# Patient Record
Sex: FEMALE | Race: OTHER | HISPANIC OR LATINO | URBAN - METROPOLITAN AREA
[De-identification: names, ages, dates, MRNs, and addresses within clinical notes are randomized per-mention and may not be internally consistent; named-entity substitution may affect disease eponyms.]

---

## 2024-09-29 ENCOUNTER — EMERGENCY (EMERGENCY)
Facility: HOSPITAL | Age: 29
LOS: 1 days | Discharge: HOME | End: 2024-09-29
Attending: EMERGENCY MEDICINE
Payer: MEDICAID

## 2024-09-29 VITALS
DIASTOLIC BLOOD PRESSURE: 61 MMHG | TEMPERATURE: 98 F | RESPIRATION RATE: 18 BRPM | SYSTOLIC BLOOD PRESSURE: 132 MMHG | WEIGHT: 159.39 LBS | OXYGEN SATURATION: 100 % | HEART RATE: 102 BPM

## 2024-09-29 LAB
ALBUMIN SERPL ELPH-MCNC: 4.1 G/DL — SIGNIFICANT CHANGE UP (ref 3.3–5.2)
ALP SERPL-CCNC: 73 U/L — SIGNIFICANT CHANGE UP (ref 40–120)
ALT FLD-CCNC: 16 U/L — SIGNIFICANT CHANGE UP
AMORPH CRY # UR COMP ASSIST: PRESENT
ANION GAP SERPL CALC-SCNC: 14 MMOL/L — SIGNIFICANT CHANGE UP (ref 5–17)
APPEARANCE UR: CLEAR — SIGNIFICANT CHANGE UP
AST SERPL-CCNC: 16 U/L — SIGNIFICANT CHANGE UP
BACTERIA # UR AUTO: ABNORMAL /HPF
BASOPHILS # BLD AUTO: 0.05 K/UL — SIGNIFICANT CHANGE UP (ref 0–0.2)
BASOPHILS NFR BLD AUTO: 0.5 % — SIGNIFICANT CHANGE UP (ref 0–2)
BILIRUB SERPL-MCNC: 0.4 MG/DL — SIGNIFICANT CHANGE UP (ref 0.4–2)
BILIRUB UR-MCNC: NEGATIVE — SIGNIFICANT CHANGE UP
BLD GP AB SCN SERPL QL: SIGNIFICANT CHANGE UP
BUN SERPL-MCNC: 8.4 MG/DL — SIGNIFICANT CHANGE UP (ref 8–20)
CALCIUM SERPL-MCNC: 9.7 MG/DL — SIGNIFICANT CHANGE UP (ref 8.4–10.5)
CAST: 1 /LPF — SIGNIFICANT CHANGE UP (ref 0–4)
CHLORIDE SERPL-SCNC: 104 MMOL/L — SIGNIFICANT CHANGE UP (ref 96–108)
CO2 SERPL-SCNC: 22 MMOL/L — SIGNIFICANT CHANGE UP (ref 22–29)
COLOR SPEC: YELLOW — SIGNIFICANT CHANGE UP
CREAT SERPL-MCNC: 0.53 MG/DL — SIGNIFICANT CHANGE UP (ref 0.5–1.3)
DIFF PNL FLD: ABNORMAL
EGFR: 131 ML/MIN/1.73M2 — SIGNIFICANT CHANGE UP
EOSINOPHIL # BLD AUTO: 0.46 K/UL — SIGNIFICANT CHANGE UP (ref 0–0.5)
EOSINOPHIL NFR BLD AUTO: 4.7 % — SIGNIFICANT CHANGE UP (ref 0–6)
GLUCOSE SERPL-MCNC: 94 MG/DL — SIGNIFICANT CHANGE UP (ref 70–99)
GLUCOSE UR QL: NEGATIVE MG/DL — SIGNIFICANT CHANGE UP
HCG SERPL-ACNC: 2501 MIU/ML — HIGH
HCT VFR BLD CALC: 38.5 % — SIGNIFICANT CHANGE UP (ref 34.5–45)
HGB BLD-MCNC: 13.2 G/DL — SIGNIFICANT CHANGE UP (ref 11.5–15.5)
IMM GRANULOCYTES NFR BLD AUTO: 0.4 % — SIGNIFICANT CHANGE UP (ref 0–0.9)
KETONES UR-MCNC: ABNORMAL MG/DL
LEUKOCYTE ESTERASE UR-ACNC: ABNORMAL
LYMPHOCYTES # BLD AUTO: 3.09 K/UL — SIGNIFICANT CHANGE UP (ref 1–3.3)
LYMPHOCYTES # BLD AUTO: 31.9 % — SIGNIFICANT CHANGE UP (ref 13–44)
MCHC RBC-ENTMCNC: 28.1 PG — SIGNIFICANT CHANGE UP (ref 27–34)
MCHC RBC-ENTMCNC: 34.3 GM/DL — SIGNIFICANT CHANGE UP (ref 32–36)
MCV RBC AUTO: 82.1 FL — SIGNIFICANT CHANGE UP (ref 80–100)
MONOCYTES # BLD AUTO: 0.67 K/UL — SIGNIFICANT CHANGE UP (ref 0–0.9)
MONOCYTES NFR BLD AUTO: 6.9 % — SIGNIFICANT CHANGE UP (ref 2–14)
NEUTROPHILS # BLD AUTO: 5.38 K/UL — SIGNIFICANT CHANGE UP (ref 1.8–7.4)
NEUTROPHILS NFR BLD AUTO: 55.6 % — SIGNIFICANT CHANGE UP (ref 43–77)
NITRITE UR-MCNC: NEGATIVE — SIGNIFICANT CHANGE UP
PH UR: 6 — SIGNIFICANT CHANGE UP (ref 5–8)
PLATELET # BLD AUTO: 265 K/UL — SIGNIFICANT CHANGE UP (ref 150–400)
POTASSIUM SERPL-MCNC: 3.8 MMOL/L — SIGNIFICANT CHANGE UP (ref 3.5–5.3)
POTASSIUM SERPL-SCNC: 3.8 MMOL/L — SIGNIFICANT CHANGE UP (ref 3.5–5.3)
PROT SERPL-MCNC: 7.1 G/DL — SIGNIFICANT CHANGE UP (ref 6.6–8.7)
PROT UR-MCNC: NEGATIVE MG/DL — SIGNIFICANT CHANGE UP
RBC # BLD: 4.69 M/UL — SIGNIFICANT CHANGE UP (ref 3.8–5.2)
RBC # FLD: 14 % — SIGNIFICANT CHANGE UP (ref 10.3–14.5)
RBC CASTS # UR COMP ASSIST: 3 /HPF — SIGNIFICANT CHANGE UP (ref 0–4)
SODIUM SERPL-SCNC: 140 MMOL/L — SIGNIFICANT CHANGE UP (ref 135–145)
SP GR SPEC: 1.02 — SIGNIFICANT CHANGE UP (ref 1–1.03)
SQUAMOUS # UR AUTO: 7 /HPF — HIGH (ref 0–5)
UROBILINOGEN FLD QL: 1 MG/DL — SIGNIFICANT CHANGE UP (ref 0.2–1)
WBC # BLD: 9.69 K/UL — SIGNIFICANT CHANGE UP (ref 3.8–10.5)
WBC # FLD AUTO: 9.69 K/UL — SIGNIFICANT CHANGE UP (ref 3.8–10.5)
WBC UR QL: 11 /HPF — HIGH (ref 0–5)
YEAST-LIKE CELLS: PRESENT

## 2024-09-29 PROCEDURE — 76801 OB US < 14 WKS SINGLE FETUS: CPT | Mod: 26

## 2024-09-29 PROCEDURE — 99282 EMERGENCY DEPT VISIT SF MDM: CPT | Mod: GC

## 2024-09-29 PROCEDURE — 99285 EMERGENCY DEPT VISIT HI MDM: CPT

## 2024-09-29 PROCEDURE — 76817 TRANSVAGINAL US OBSTETRIC: CPT | Mod: 26

## 2024-09-29 RX ORDER — CEPHALEXIN 500 MG
500 CAPSULE ORAL ONCE
Refills: 0 | Status: COMPLETED | OUTPATIENT
Start: 2024-09-29 | End: 2024-09-29

## 2024-09-29 RX ORDER — ACETAMINOPHEN 325 MG
650 TABLET ORAL ONCE
Refills: 0 | Status: COMPLETED | OUTPATIENT
Start: 2024-09-29 | End: 2024-09-29

## 2024-09-29 RX ADMIN — Medication 650 MILLIGRAM(S): at 21:37

## 2024-09-29 RX ADMIN — Medication 500 MILLIGRAM(S): at 23:50

## 2024-09-29 NOTE — ED ADULT NURSE NOTE - NSFALLUNIVINTERV_ED_ALL_ED
Bed/Stretcher in lowest position, wheels locked, appropriate side rails in place/Call bell, personal items and telephone in reach/Instruct patient to call for assistance before getting out of bed/chair/stretcher/Non-slip footwear applied when patient is off stretcher/[place] to call system/Physically safe environment - no spills, clutter or unnecessary equipment/Purposeful proactive rounding/Room/bathroom lighting operational, light cord in reach

## 2024-09-29 NOTE — ED STATDOCS - PATIENT PORTAL LINK FT
You can access the FollowMyHealth Patient Portal offered by [facility] by registering at the following website: [email or web address]. By joining [employer]s FollowMyHealth portal, you will also be able to view your health information using other applications (apps) compatible with our system.

## 2024-09-29 NOTE — ED STATDOCS - PROGRESS NOTE DETAILS
LILIYA Badillo: Results reviewed with patient. OB consulted.   OB: [facility] LILIYA Badillo: Patient evaluated by intake physician. HPI/ROS/PE as noted above. Will follow up plan per intake physician and continue to assess patient. LILIYA Badillo: Evaluated by OB. To be sent home with Cytotec. Has appt. with clinic tomorrow and Wednesday.

## 2024-09-29 NOTE — ED STATDOCS - OBJECTIVE STATEMENT
28-year-old female  (twins), 2 miscarriage,  approximately 3 months pregnant presents with vaginal spotting started today.  No nausea or vomiting.  Patient report mild headache.  No dysuria.  Patient has seen OB had ultrasound that was done on  reports that fetus was small.  She has a follow-up appointment.       Manoj

## 2024-09-29 NOTE — ED STATDOCS - NSFOLLOWUPINSTRUCTIONS_ED_ALL_ED_FT
- Please keep all scheduled follow up appointments with OBGYN.   - Please bring all documentation from your ED visit to any related future follow up appointment.  - Please call to schedule follow up appointment with your primary care physician within 24-48 hours.  - Please seek immediate medical attention or return to the ED for any new/worsening, signs/symptoms, or concerns.    - Acuda a todas las citas de seguimiento programadas con el obstetra y ginecólogo.   - Traiga toda la documentación de white visita al servicio de urgencias a cualquier angela de seguimiento futura relacionada.  - Llame para programar marisa angela de seguimiento con white médico de atención primaria dentro de las 24 a 48 horas.  - Busque atención médica inmediata o regrese al servicio de urgencias si detecta signos, síntomas o inquietudes nuevos o que empeoran.        Aborto espontáneo  Miscarriage    El aborto espontáneo es la pérdida de un bebé que no ha nacido (feto) antes de la semana 20 del embarazo. La mayor parte de los abortos espontáneos ocurre alin los primeros 3 meses de embarazo. A veces, un aborto ocurre antes de que la polo sepa que está embarazada.    El aborto espontáneo puede ser marisa experiencia que afecte emocionalmente a la persona. Si ha sufrido un aborto espontáneo, hable con white médico y hágale las preguntas que tenga sobre el aborto espontáneo, el proceso de duelo y los planes futuros de embarazo.    ¿Cuáles son las causas?  Entre las causas de un aborto espontáneo se incluyen las siguientes:    Problemas genéticos o cromosómicos del feto. Estos problemas impiden que el bebé se desarrolle con normalidad. En general, son el resultado de errores fortuitos que ocurren en la etapa temprana del desarrollo y que no se transmiten de padres a hijos (no se heredan).  Infección en el rani del útero.  Trastornos que afectan el equilibrio hormonal del organismo.  Problemas en el rani del útero, madison white adelgazamiento y apertura antes de que el embarazo llegue a término (insuficiencia del rani de útero).  Problemas en el útero. Estos pueden incluir, entre otros, los siguientes:    Forma anormal del útero.  Fibromas en el útero.  Anormalidades congénitas. Estos son problemas que ya estaban presentes en el nacimiento.  Ciertas enfermedades crónicas.  Fumar, beber alcohol o usar drogas.  Lesiones (traumatismos).    En muchos de los casos, se desconoce la causa de los abortos espontáneos.    ¿Cuáles son los signos o los síntomas?  Los síntomas de esta afección incluyen los siguientes:    Sangrado o manchado vaginal, con o sin cólicos o dolor.  Dolor o cólicos en el abdomen o en la parte inferior de la espalda.  Eliminación de líquido, tejidos o coágulos sanguíneos por la vagina.    ¿Cómo se diagnostica?  Esta afección se puede diagnosticar en función de lo siguiente:    Examen físico.  Ecografía.  Análisis de cyndie.  Análisis de orina.    ¿Cómo se trata?  En algunos casos, el tratamiento de un aborto espontáneo no es necesario si se eliminan de forma natural todos los tejidos que se encontraban en el útero. Si fuera necesario realizar un tratamiento por esta afección, yun puede incluir lo siguiente:    Dilatación y curetaje (D&C). Mediante yun procedimiento, se expande el rani del útero y se raspan las lynn (endometrio). [place] se realiza solamente si queda tejido del feto o la placenta dentro del cuerpo (aborto espontáneo incompleto).  Medicamentos, por ejemplo:    Antibióticos para tratar marisa infección.  Medicamentos para ayudar al cuerpo a eliminar los restos de tejido.  Medicamentos para reducir (contraer) el tamaño del útero. Estos medicamentos se pueden administrar si tiene un sangrado abundante.    Si white factor sanguíneo es Rh negativo y el de white bebé es Rh positivo, usted necesitará marisa inyección del medicamento llamado inmunoglobulina Rhpara proteger a los bebés futuros de tener problemas con el factor sanguíneo Rh. Los términos "Rh negativo" y "Rh positivo" hacen referencia a la presencia o no en la cyndie de marisa proteína específica que se encuentra en la superficie de los glóbulos rojos (factor Rh).    Siga estas indicaciones en white casa:      Medicamentos     [place] los medicamentos de venta veronica y los recetados solamente madison se lo haya indicado el médico.  Si le recetaron antibióticos, tómelos madison se lo haya indicado el médico. No deje de maeve los antibióticos aunque comience a sentirse mejor.  No tome antiinflamatorios no esteroideos (JOSIE), tales madison aspirina e ibuprofeno, a menos que se lo indique el médico. Estos medicamentos pueden provocarle sangrado.        Actividad    [place] reposo según lo indicado. Pregúntele al médico qué actividades son seguras para usted.  Pídale a alguien que la ayude con las responsabilidades familiares y del hogar alin yun tiempo.        Instrucciones generales    Lleve un registro de la cantidad y la saturación de las toallas higiénicas que utiliza cada día. Anote esta información.  Anote la cantidad de tejido o coágulos sanguíneos que expulsa por la vagina. Guarde las cantidades grandes de tejidos para que el médico los examine.  No use tampones, no se tawana duchas vaginales ni tenga relaciones sexuales hasta que el médico la autorice.  Para que usted y white janene puedan sobrellevar el proceso del duelo, hable con white médico o busque apoyo psicológico.  Cuando esté lista, visite a white médico para hablar sobre los pasos importantes que deberá seguir en relación con white maribel. También hable sobre las medidas que deberá maeve para tener un embarazo saludable en el futuro.  Concurra a todas las visitas de seguimiento madison se lo haya indicado el médico. [place] es importante.    Dónde encontrar más información  Colegio Estadounidense de Obstetras y Ginecólogos (American College of Obstetricians and Gynecologists): www.acog.org  Departamento de Maribel y Servicios Humanos de los [place], Oficina de Maribel de la [place] (U.S. Department of Health and Human Services, Office on Women’s Health): www.womenshealth.gov    Comuníquese con un médico si:  Tiene fiebre o siente escalofríos.  Tiene marisa secreción vaginal con mal olor.  El sangrado aumenta en vez de disminuir.    Solicite ayuda de inmediato si:  Siente calambres intensos o dolor en la espalda o en el abdomen.  Elimina coágulos de cyndie o tejido por la vagina del tamaño de marisa nuez o más grandes.  Necesita más de marisa toalla higiénica de tamaño regular por hora.  Se siente mareada o débil.  Se desmaya.  Siente marisa tristeza que la invade o piensa en lastimarse.    Resumen  La mayor parte de los abortos espontáneos ocurre alin los primeros 3 meses de embarazo. En algunos casos, el aborto espontáneo ocurre antes de que la polo sepa que está embarazada.  Siga las indicaciones del médico para el cuidado en el hogar. Concurra a todas las visitas de control.  Para que usted y white janene puedan sobrellevar el proceso del duelo, hable con white médico o busque apoyo psicológico.    NOTAS ADICIONALES E INSTRUCCIONES    Please follow up with your Primary MD in 24-48 hr.  Seek immediate medical care for any new/worsening signs or symptoms. - Prescription sent to pharmacy.  - Please keep all scheduled follow up appointments with OBGYN.   - Please bring all documentation from your ED visit to any related future follow up appointment.  - Please call to schedule follow up appointment with your primary care physician within 24-48 hours.  - Please seek immediate medical attention or return to the ED for any new/worsening, signs/symptoms, or concerns.    - Receta enviada a farmacia.  - Acuda a todas las citas de seguimiento programadas con el obstetra y ginecólogo.   - Traiga toda la documentación de white visita al servicio de urgencias a cualquier angela de seguimiento futura relacionada.  - Llame para programar marisa angela de seguimiento con white médico de atención primaria dentro de las 24 a 48 horas.  - Busque atención médica inmediata o regrese al servicio de urgencias si detecta signos, síntomas o inquietudes nuevos o que empeoran.        Aborto espontáneo  Miscarriage    El aborto espontáneo es la pérdida de un bebé que no ha nacido (feto) antes de la semana 20 del embarazo. La mayor parte de los abortos espontáneos ocurre alin los primeros 3 meses de embarazo. A veces, un aborto ocurre antes de que la polo sepa que está embarazada.    El aborto espontáneo puede ser marisa experiencia que afecte emocionalmente a la persona. Si ha sufrido un aborto espontáneo, hable con white médico y hágale las preguntas que tenga sobre el aborto espontáneo, el proceso de duelo y los planes futuros de embarazo.    ¿Cuáles son las causas?  Entre las causas de un aborto espontáneo se incluyen las siguientes:    Problemas genéticos o cromosómicos del feto. Estos problemas impiden que el bebé se desarrolle con normalidad. En general, son el resultado de errores fortuitos que ocurren en la etapa temprana del desarrollo y que no se transmiten de padres a hijos (no se heredan).  Infección en el rani del útero.  Trastornos que afectan el equilibrio hormonal del organismo.  Problemas en el rani del útero, madison white adelgazamiento y apertura antes de que el embarazo llegue a término (insuficiencia del rani de útero).  Problemas en el útero. Estos pueden incluir, entre otros, los siguientes:    Forma anormal del útero.  Fibromas en el útero.  Anormalidades congénitas. Estos son problemas que ya estaban presentes en el nacimiento.  Ciertas enfermedades crónicas.  Fumar, beber alcohol o usar drogas.  Lesiones (traumatismos).    En muchos de los casos, se desconoce la causa de los abortos espontáneos.    ¿Cuáles son los signos o los síntomas?  Los síntomas de esta afección incluyen los siguientes:    Sangrado o manchado vaginal, con o sin cólicos o dolor.  Dolor o cólicos en el abdomen o en la parte inferior de la espalda.  Eliminación de líquido, tejidos o coágulos sanguíneos por la vagina.    ¿Cómo se diagnostica?  Esta afección se puede diagnosticar en función de lo siguiente:    Examen físico.  Ecografía.  Análisis de cyndie.  Análisis de orina.    ¿Cómo se trata?  En algunos casos, el tratamiento de un aborto espontáneo no es necesario si se eliminan de forma natural todos los tejidos que se encontraban en el útero. Si fuera necesario realizar un tratamiento por esta afección, yun puede incluir lo siguiente:    Dilatación y curetaje (D&C). Mediante yun procedimiento, se expande el rani del útero y se raspan las lynn (endometrio). [place] se realiza solamente si queda tejido del feto o la placenta dentro del cuerpo (aborto espontáneo incompleto).  Medicamentos, por ejemplo:    Antibióticos para tratar marisa infección.  Medicamentos para ayudar al cuerpo a eliminar los restos de tejido.  Medicamentos para reducir (contraer) el tamaño del útero. Estos medicamentos se pueden administrar si tiene un sangrado abundante.    Si white factor sanguíneo es Rh negativo y el de white bebé es Rh positivo, usted necesitará marisa inyección del medicamento llamado inmunoglobulina Rhpara proteger a los bebés futuros de tener problemas con el factor sanguíneo Rh. Los términos "Rh negativo" y "Rh positivo" hacen referencia a la presencia o no en la cyndie de marisa proteína específica que se encuentra en la superficie de los glóbulos rojos (factor Rh).    Siga estas indicaciones en white casa:      Medicamentos     [place] los medicamentos de venta veronica y los recetados solamente madison se lo haya indicado el médico.  Si le recetaron antibióticos, tómelos madison se lo haya indicado el médico. No deje de maeve los antibióticos aunque comience a sentirse mejor.  No tome antiinflamatorios no esteroideos (JOSIE), tales madison aspirina e ibuprofeno, a menos que se lo indique el médico. Estos medicamentos pueden provocarle sangrado.        Actividad    Tawana reposo según lo indicado. Pregúntele al médico qué actividades son seguras para usted.  Pídale a alguien que la ayude con las responsabilidades familiares y del hogar alin yun tiempo.        Instrucciones generales    Lleve un registro de la cantidad y la saturación de las toallas higiénicas que utiliza cada día. Anote esta información.  Anote la cantidad de tejido o coágulos sanguíneos que expulsa por la vagina. Guarde las cantidades grandes de tejidos para que el médico los examine.  No use tampones, no se tawana duchas vaginales ni tenga relaciones sexuales hasta que el médico la autorice.  Para que usted y white janene puedan sobrellevar el proceso del duelo, hable con white médico o busque apoyo psicológico.  Cuando esté lista, visite a white médico para hablar sobre los pasos importantes que deberá seguir en relación con white maribel. También hable sobre las medidas que deberá maeve para tener un embarazo saludable en el futuro.  Concurra a todas las visitas de seguimiento madison se lo haya indicado el médico. [place] es importante.    Dónde encontrar más información  Colegio Estadounidense de Obstetras y Ginecólogos (American College of Obstetricians and Gynecologists): www.acog.org  Departamento de Maribel y Servicios Humanos de los [place], Oficina de Maribel de la [place] (U.S. Department of Health and Human Services, Office on Women’s Health): www.womenshealth.gov    Comuníquese con un médico si:  Tiene fiebre o siente escalofríos.  Tiene marisa secreción vaginal con mal olor.  El sangrado aumenta en vez de disminuir.    Solicite ayuda de inmediato si:  Siente calambres intensos o dolor en la espalda o en el abdomen.  Elimina coágulos de cyndie o tejido por la vagina del tamaño de marisa nuez o más grandes.  Necesita más de marisa toalla higiénica de tamaño regular por hora.  Se siente mareada o débil.  Se desmaya.  Siente marisa tristeza que la invade o piensa en lastimarse.    Resumen  La mayor parte de los abortos espontáneos ocurre alin los primeros 3 meses de embarazo. En algunos casos, el aborto espontáneo ocurre antes de que la polo sepa que está embarazada.  Siga las indicaciones del médico para el cuidado en el hogar. Concurra a todas las visitas de control.  Para que usted y white janene puedan sobrellevar el proceso del duelo, hable con white médico o busque apoyo psicológico.    NOTAS ADICIONALES E INSTRUCCIONES    Please follow up with your Primary MD in 24-48 hr.  Seek immediate medical care for any new/worsening signs or symptoms.

## 2024-09-29 NOTE — ED STATDOCS - ATTENDING APP SHARED VISIT CONTRIBUTION OF CARE
I, Kristian Alfred, performed the initial face to face bedside interview with this patient regarding history of present illness, review of symptoms and relevant past medical, social and family history.  I completed an independent physical examination.  I was the initial provider who evaluated this patient. I have signed out the follow up of any pending tests (i.e. labs, radiological studies) to the [facility].  I have communicated the patient’s plan of care and disposition with the [facility].

## 2024-09-29 NOTE — ED ADULT TRIAGE NOTE - CHIEF COMPLAINT QUOTE
Pt walks in for triage c/o being 3 weeks pregnant and began vaginal spotting today at 15:00. Pt has PMHx of 2 miscarriages. Pt denies heavy bleeding and saturating pads, but endorses worsening spotting with exertion.

## 2024-09-29 NOTE — ED STATDOCS - NS ED ROS FT
CONSTITUTIONAL - no  fever, no diaphoresis, no weight change  SKIN - no rash  HEMATOLOGIC - no bleeding, no bruising  EYES - no eye pain, no blurred vision  ENT - no change in hearing, no pain  RESPIRATORY - no shortness of breath, no cough  CARDIAC - no chest pain, no palpitations  GI - no abd pain, no nausea, no vomiting, no diarrhea, no constipation, no bleeding  GENITO-URINARY - no discharge, no dysuria; no hematuria, (+) vaginal spotting   ENDO - no polydipsia, no polyuria, no heat/no cold intolerance  MUSCULOSKELETAL - no joint pain, no swelling, no redness  NEUROLOGIC - no weakness, no headache, no anesthesia, no paresthesias  PSYCH - no anxiety, non suicidal, non homicidal, no hallucination, no depression

## 2024-09-29 NOTE — ED STATDOCS - CLINICAL SUMMARY MEDICAL DECISION MAKING FREE TEXT BOX
28-year-old female  (twins), 2 miscarriage,  approximately 3 months pregnant presents with vaginal spotting started today.  No nausea or vomiting.  Patient report mild headache.  No dysuria.  Patient has seen OB had ultrasound that was done on  reports that fetus was small.  She has a follow-up appointment.     will get blood work, urine, OB ultrasound.  Will give Tylenol for headache.

## 2024-09-30 VITALS
SYSTOLIC BLOOD PRESSURE: 145 MMHG | OXYGEN SATURATION: 98 % | DIASTOLIC BLOOD PRESSURE: 81 MMHG | TEMPERATURE: 98 F | HEART RATE: 74 BPM | RESPIRATION RATE: 20 BRPM

## 2024-09-30 PROBLEM — Z00.00 ENCOUNTER FOR PREVENTIVE HEALTH EXAMINATION: Status: ACTIVE | Noted: 2024-09-30

## 2024-09-30 PROCEDURE — 86901 BLOOD TYPING SEROLOGIC RH(D): CPT

## 2024-09-30 PROCEDURE — 84702 CHORIONIC GONADOTROPIN TEST: CPT

## 2024-09-30 PROCEDURE — 80053 COMPREHEN METABOLIC PANEL: CPT

## 2024-09-30 PROCEDURE — 99284 EMERGENCY DEPT VISIT MOD MDM: CPT

## 2024-09-30 PROCEDURE — 76817 TRANSVAGINAL US OBSTETRIC: CPT

## 2024-09-30 PROCEDURE — 86850 RBC ANTIBODY SCREEN: CPT

## 2024-09-30 PROCEDURE — 36415 COLL VENOUS BLD VENIPUNCTURE: CPT

## 2024-09-30 PROCEDURE — T1013: CPT

## 2024-09-30 PROCEDURE — 81001 URINALYSIS AUTO W/SCOPE: CPT

## 2024-09-30 PROCEDURE — 76801 OB US < 14 WKS SINGLE FETUS: CPT

## 2024-09-30 PROCEDURE — 86900 BLOOD TYPING SEROLOGIC ABO: CPT

## 2024-09-30 PROCEDURE — 87086 URINE CULTURE/COLONY COUNT: CPT

## 2024-09-30 PROCEDURE — 85025 COMPLETE CBC W/AUTO DIFF WBC: CPT

## 2024-09-30 RX ORDER — FLUCONAZOLE 200 MG
150 TABLET ORAL ONCE
Refills: 0 | Status: COMPLETED | OUTPATIENT
Start: 2024-09-30 | End: 2024-09-30

## 2024-09-30 RX ADMIN — Medication 150 MILLIGRAM(S): at 02:28

## 2024-09-30 NOTE — CONSULT NOTE ADULT - ASSESSMENT
A/P:     D/w** A/P: GWEN ZENDEJAS is a 28y @ 9w1d by US who presents to ED for vaginal spotting since 3pm and suprapubic abdominal cramping, found to have intrauterine gestation with no fetal cardiac activity identified, consistent with missed .     D/w** A/P: GWEN ZENDEJAS is a 28y @ 9w1d by US who presents to ED for vaginal spotting since 3pm and suprapubic abdominal cramping, found to have intrauterine gestation with no fetal cardiac activity identified, consistent with missed .     Plan:   - This was a desired pregnancy. SAB#3 this year.  - Patient counselled about options for management of missed AB, including expectant management, cytotec medication, or surgical D&C. Pt elected for medical management with buccal Cytotec 800mg q4h for three doses.   - Pt has MFM appointment for confirmation of MAB on Monday,   - To facilitate appt with MFM for consultation for recurrent pregnancy loss  - Pt with [facility] clinic appt on Wednesday, 10/2   D/w Dr Mcneal

## 2024-09-30 NOTE — CONSULT NOTE ADULT - SUBJECTIVE AND OBJECTIVE BOX
GWEN ZENDEJAS is a 28y @ 9w1d by US who presents to ED for vaginal spotting since 3pm and suprapubic abdominal cramping.     Outpatient GYN: [facility]    OB: pCS twins (2018);  @~3months; 2024 in first trimester  Gyn: denies fibroids, cysts, or polyps; denies abn paps; denies STIs  PMH: gastritis  PSH: pCS  Med: PNV  All: NKDA    Vitals:   T(C): 36.9 (24 @ 19:17), Max: 36.9 (24 @ 19:17)  HR: 102 (24 @ 19:17) (102 - 102)  BP: 132/61 (24 @ 19:17) (132/61 - 132/61)  RR: 18 (24 @ 19:17) (18 - 18)  SpO2: 100% (24 @ 19:17) (100% - 100%)    General: AAOx3, NAD  Abd: Soft, nontender, non distended; no rebound/guarding  Pelvic:    Labs:                        13.2   9.69  )-----------( 265      ( 29 Sep 2024 21:38 )             38.5         140  |  104  |  8.4  ----------------------------<  94  3.8   |  22.0  |  0.53    Ca    9.7      29 Sep 2024 21:38    TPro  7.1  /  Alb  4.1  /  TBili  0.4  /  DBili  x   /  AST  16  /  ALT  16  /  AlkPhos  73      SERUM bhcg    2501.0   @ 21:38      Radiology:     GWEN ZENDEJAS is a 28y @ 9w1d by US who presents to ED for vaginal spotting since 3pm and suprapubic abdominal cramping.     Outpatient GYN: [facility]    OB: pCS twins (2018);  @~3months; 2024 in first trimester  Gyn: denies fibroids, cysts, or polyps; denies abn paps; denies STIs  PMH: gastritis  PSH: pCS  Med: PNV  All: NKDA    Vitals:   T(C): 36.9 (24 @ 19:17), Max: 36.9 (24 @ 19:17)  HR: 102 (24 @ 19:17) (102 - 102)  BP: 132/61 (24 @ 19:17) (132/61 - 132/61)  RR: 18 (24 @ 19:17) (18 - 18)  SpO2: 100% (24 @ 19:17) (100% - 100%)    General: AAOx3, NAD  Abd: Soft, nontender, non distended; no rebound/guarding  Speculum Exam:     Labs:                        13.2   9.69  )-----------( 265      ( 29 Sep 2024 21:38 )             38.5         140  |  104  |  8.4  ----------------------------<  94  3.8   |  22.0  |  0.53    Ca    9.7      29 Sep 2024 21:38    TPro  7.1  /  Alb  4.1  /  TBili  0.4  /  DBili  x   /  AST  16  /  ALT  16  /  AlkPhos  73      SERUM bhcg    2501.0   @ 21:38      Radiology:  < from: US Transvaginal, OB (24 @ 23:29) >  ACC: [id number] EXAM:  US OB TRANSVAGINAL   ORDERED BY: BOBBY GENTILE     ACC: [id number] EXAM:  US OB LES THAN 14 WKS 1ST GEST   ORDERED BY: BOBBY GENTILE     PROCEDURE DATE:  2024          INTERPRETATION:  CLINICAL INFORMATION: Vaginal bleeding.    LMP: 2024    Estimated Gestational Age by LMP: 12 weeks 0 days    COMPARISON: 5/3/2023    Endovaginal and transabdominal pelvic sonogram. Color and Spectral   Doppler was performed.    FINDINGS:  Uterus: 9.3 x 4.9 x 7.0 cm. Single intrauterine gestational sac.    Gestational Sac Size (mean): 39.7 mm  Gestational Sac Shape : Normal.  Crown Rump Length: 2.46 cm  Estimated Gestational Age: 9 weeks, 1 days weeks/days  Yolk Sac: Not visualized  Fetal Heart Rate: Not detected.    Right ovary: 2.4 cm x 1.7 cm x 1.6 cm. Within normal limits. Normal   arterial and venous waveforms.  Left ovary: 2.8 cm x 1.6 cm x 1.6 cm. Within normal limits. Normal   arterial and venous waveforms.    Fluid: None.    IMPRESSION:  Intrauterine gestation with gestational age of 9 weeks 1 day. No fetal   heart rate identified. Findings are compatible with fetal demise.    --- End of Report ---            CHRISTINA REYNOLDS MD; Attending Radiologist  This document has been electronically signed. Sep 30 2024 12:04AM    < end of copied text >     GWEN ZENDEJAS is a 28y @ 9w1d by US who presents to ED for vaginal spotting since 3pm and suprapubic abdominal cramping.     Outpatient GYN: [facility]    OB: pCS twins (2018);  @~7-8weeks; 2024 in first trimester (both w/ expectant management)  Gyn: denies fibroids, cysts, or polyps; denies abn paps; denies STIs  PMH: gastritis  PSH: pCS  Med: PNV  All: NKDA    Vitals:   T(C): 36.9 (24 @ 19:17), Max: 36.9 (24 @ 19:17)  HR: 102 (24 @ 19:17) (102 - 102)  BP: 132/61 (24 @ 19:17) (132/61 - 132/61)  RR: 18 (24 @ 19:17) (18 - 18)  SpO2: 100% (24 @ 19:17) (100% - 100%)    General: AAOx3, NAD  Abd: Soft, nontender, non distended; no rebound/guarding  Speculum Exam: some dark colored discharge noted from cervical os; no active bleeding noted  SVE: closed cervical os    Labs:                        13.2   9.69  )-----------( 265      ( 29 Sep 2024 21:38 )             38.5         140  |  104  |  8.4  ----------------------------<  94  3.8   |  22.0  |  0.53    Ca    9.7      29 Sep 2024 21:38    TPro  7.1  /  Alb  4.1  /  TBili  0.4  /  DBili  x   /  AST  16  /  ALT  16  /  AlkPhos  73      SERUM bhcg    2501.0   @ 21:38      Radiology:  < from: US Transvaginal, OB (24 @ 23:29) >  ACC: [id number] EXAM:  US OB TRANSVAGINAL   ORDERED BY: BOBBY GENTILE     ACC: [id number] EXAM:  US OB LES THAN 14 WKS 1ST GEST   ORDERED BY: BOBBY GENTILE     PROCEDURE DATE:  2024          INTERPRETATION:  CLINICAL INFORMATION: Vaginal bleeding.    LMP: 2024    Estimated Gestational Age by LMP: 12 weeks 0 days    COMPARISON: 5/3/2023    Endovaginal and transabdominal pelvic sonogram. Color and Spectral   Doppler was performed.    FINDINGS:  Uterus: 9.3 x 4.9 x 7.0 cm. Single intrauterine gestational sac.    Gestational Sac Size (mean): 39.7 mm  Gestational Sac Shape : Normal.  Crown Rump Length: 2.46 cm  Estimated Gestational Age: 9 weeks, 1 days weeks/days  Yolk Sac: Not visualized  Fetal Heart Rate: Not detected.    Right ovary: 2.4 cm x 1.7 cm x 1.6 cm. Within normal limits. Normal   arterial and venous waveforms.  Left ovary: 2.8 cm x 1.6 cm x 1.6 cm. Within normal limits. Normal   arterial and venous waveforms.    Fluid: None.    IMPRESSION:  Intrauterine gestation with gestational age of 9 weeks 1 day. No fetal   heart rate identified. Findings are compatible with fetal demise.    --- End of Report ---            CHRISTINA REYNOLDS MD; Attending Radiologist  This document has been electronically signed. Sep 30 2024 12:04AM    < end of copied text >     GWEN ZENDEJAS is a 28y @ 9w1d by US (LMP 24) who presents to ED for vaginal spotting since 3pm and suprapubic abdominal cramping.     Outpatient GYN: [facility]    OB: pCS twins (2018); SAB  @~7-8weeks; SAB 2024 in first trimester (both w/ expectant management)  Gyn: denies fibroids, cysts, or polyps; denies abn paps; denies STIs  PMH: gastritis  PSH: pCS  Med: PNV  All: NKDA    Vitals:   T(C): 36.9 (24 @ 19:17), Max: 36.9 (24 @ 19:17)  HR: 102 (24 @ 19:17) (102 - 102)  BP: 132/61 (24 @ 19:17) (132/61 - 132/61)  RR: 18 (24 @ 19:17) (18 - 18)  SpO2: 100% (24 @ 19:17) (100% - 100%)    General: AAOx3, NAD  Abd: Soft, nontender, non distended; no rebound/guarding  Speculum Exam: some dark colored discharge noted from cervical os; no active bleeding noted  SVE: closed cervical os    Labs:                        13.2   9.69  )-----------( 265      ( 29 Sep 2024 21:38 )             38.5         140  |  104  |  8.4  ----------------------------<  94  3.8   |  22.0  |  0.53    Ca    9.7      29 Sep 2024 21:38    TPro  7.1  /  Alb  4.1  /  TBili  0.4  /  DBili  x   /  AST  16  /  ALT  16  /  AlkPhos  73      SERUM bhcg    2501.0   @ 21:38      Radiology:  < from: US Transvaginal, OB (24 @ 23:29) >  ACC: [id number] EXAM:  US OB TRANSVAGINAL   ORDERED BY: BOBBY GENTILE     ACC: [id number] EXAM:  US OB LES THAN 14 WKS 1ST GEST   ORDERED BY: BOBBY GENTILE     PROCEDURE DATE:  2024          INTERPRETATION:  CLINICAL INFORMATION: Vaginal bleeding.    LMP: 2024    Estimated Gestational Age by LMP: 12 weeks 0 days    COMPARISON: 5/3/2023    Endovaginal and transabdominal pelvic sonogram. Color and Spectral   Doppler was performed.    FINDINGS:  Uterus: 9.3 x 4.9 x 7.0 cm. Single intrauterine gestational sac.    Gestational Sac Size (mean): 39.7 mm  Gestational Sac Shape : Normal.  Crown Rump Length: 2.46 cm  Estimated Gestational Age: 9 weeks, 1 days weeks/days  Yolk Sac: Not visualized  Fetal Heart Rate: Not detected.    Right ovary: 2.4 cm x 1.7 cm x 1.6 cm. Within normal limits. Normal   arterial and venous waveforms.  Left ovary: 2.8 cm x 1.6 cm x 1.6 cm. Within normal limits. Normal   arterial and venous waveforms.    Fluid: None.    IMPRESSION:  Intrauterine gestation with gestational age of 9 weeks 1 day. No fetal   heart rate identified. Findings are compatible with fetal demise.    --- End of Report ---            CHRISTINA REYNOLDS MD; Attending Radiologist  This document has been electronically signed. Sep 30 2024 12:04AM    < end of copied text >

## 2024-09-30 NOTE — CONSULT NOTE ADULT - ATTENDING COMMENTS
28y @ 9w1d by US who presents to ED for vaginal spotting since 3pm and suprapubic abdominal cramping.  HR: 102 (24 @ 19:17) (102 - 102)  BP: 132/61 (24 @ 19:17) (132/61 - 132/61)  PE - NAD  SSE: cervix closed - no active bleeding, old brown tinged discharge  SVE: closed  h/h 13.2/38.5  2501.0   @ 21:38  IMPRESSION:  Intrauterine gestation with gestational age of 9 weeks 1 day. No fetal   heart rate identified. Findings are compatible with fetal demise.  29 y/o  @ 9+1 with Missed ab   - Reviewed findings and options with patient, including expectant management, medical management and surgical management   - this was a highly desired pregnancy and pt visibly upset as this is her third loss in a couple of years   - pt reports other 2 miscarriages were SAB and she did not receive any intervention   - pt was scheduled for sono with MFM in am and OB visit 10/2   - pt would prefer to take PO meds Rx for cytotec    - pt also given option to keep appointment and to discuss the potential for MFM consult for recurrent pregnancy loss. Pt also advised to keep appt with [facility] Pt can give herself a few days and then take medication if she desires  - bleeding precautions were discussed, as well as return to ER precautions  - plan d/c home and pt to keep appt in am    Marly Ruvalcaba MD

## 2024-10-01 LAB
CULTURE RESULTS: SIGNIFICANT CHANGE UP
SPECIMEN SOURCE: SIGNIFICANT CHANGE UP